# Patient Record
Sex: FEMALE | Race: WHITE | NOT HISPANIC OR LATINO | URBAN - METROPOLITAN AREA
[De-identification: names, ages, dates, MRNs, and addresses within clinical notes are randomized per-mention and may not be internally consistent; named-entity substitution may affect disease eponyms.]

---

## 2017-01-24 ENCOUNTER — OFFICE VISIT - RIVER FALLS (OUTPATIENT)
Dept: FAMILY MEDICINE | Facility: CLINIC | Age: 27
End: 2017-01-24

## 2017-01-24 ASSESSMENT — MIFFLIN-ST. JEOR: SCORE: 1268.81

## 2018-01-20 ENCOUNTER — OFFICE VISIT - RIVER FALLS (OUTPATIENT)
Dept: FAMILY MEDICINE | Facility: CLINIC | Age: 28
End: 2018-01-20

## 2022-02-12 VITALS
TEMPERATURE: 97.4 F | SYSTOLIC BLOOD PRESSURE: 92 MMHG | RESPIRATION RATE: 16 BRPM | HEIGHT: 61 IN | DIASTOLIC BLOOD PRESSURE: 60 MMHG | HEART RATE: 88 BPM | WEIGHT: 137 LBS | BODY MASS INDEX: 25.86 KG/M2

## 2022-02-12 VITALS
OXYGEN SATURATION: 99 % | DIASTOLIC BLOOD PRESSURE: 78 MMHG | SYSTOLIC BLOOD PRESSURE: 112 MMHG | TEMPERATURE: 97.3 F | WEIGHT: 136 LBS | HEART RATE: 60 BPM

## 2022-02-16 NOTE — PROGRESS NOTES
"   Patient:   JULIA ABAD            MRN: [id number]            FIN: [id number]               Age:   26 years     Sex:  Female     :  1990   Associated Diagnoses:   Mastitis   Author:   Db Son PA-C      Chief Complaint   2017 4:07 PM CST    Pt here for fever and some breast pain on left side that started yesterday. Pt states she is also having some \"cloudy milk\" from right breast that started today. Pt delivered her baby 17        History of Present Illness   Chief complaint and symptoms noted above and confirmed with patient          Review of Systems   Constitutional:  Fever.    Ear/Nose/Mouth/Throat:  Negative.    Respiratory:  Negative.    Breast:  Left breast, Pain, Redness.       Health Status   Allergies:    Allergic Reactions (Selected)  No known allergies   Medications:  (Selected)   Documented Medications  Documented  Iron Chews 15 mg oral tablet, chewable: 1 tab(s) ( 15 mg ), po, daily, 0 Refill(s), Type: Maintenance  Prenatal Multivitamins with Vitamin B Complex, Vitamin C, Minerals and L-Methylfolate oral capsule...: 1 cap(s), po, daily, 0 Refill(s), Type: Maintenance  ibuprofen 400 mg oral tablet: 1 tab(s) ( 400 mg ), po, prn, 0 Refill(s), Type: Maintenance   Problem list:    All Problems  Tobacco user / SNOMED CT 999690455 / Probable      Histories   Past Medical History:    No active or resolved past medical history items have been selected or recorded.   Family History:    High blood pressure  Father     Procedure history:    Vaginal delivery ([id number]).   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  Lives with Self, Children.  1 children.      Nutrition and Health Assessment            Type of diet: Regular.      Exercise and Physical Activity Assessment            Exercise frequency: Nothing regular.      Sexual Assessment            Sexually active: Yes.  Sexual orientation: Heterosexual.  Contraceptive Use Details: Intrauterine device.        Physical Examination   Vital Signs   1/24/2017 4:07 PM CST Temperature Tympanic 97.4 DegF  LOW    Peripheral Pulse Rate 88 bpm    Pulse Site Radial artery    Respiratory Rate 16 br/min    Systolic Blood Pressure 92 mmHg    Diastolic Blood Pressure 60 mmHg    Mean Arterial Pressure 71 mmHg    BP Site Right arm      Measurements from flowsheet : Measurements   1/24/2017 4:07 PM CST Height Measured - Standard 61 in    Weight Measured - Standard 137 lb    BSA 1.63 m2    Body Mass Index 25.88 kg/m2      General:  No acute distress.    HENT:  Tympanic membranes are clear, No pharyngeal erythema, No sinus tenderness.    Neck:  Supple, Non-tender, No lymphadenopathy.    Respiratory:  Lungs are clear to auscultation.    Cardiovascular:  Normal rate, Regular rhythm, No murmur.    Breast:       Skin changes: Left, Red, Warm, firm red warm area in upper outer quadrant.       Impression and Plan   Diagnosis     Mastitis ([id number] N61.0).     Summary:  will treat with dicloxacillin, use ibuprofen/tylenol for pain, continue nursing  follow up if not improving.    Orders     Orders   Pharmacy:  dicloxacillin 250 mg oral capsule (Prescribe): 1 cap(s) ( 250 mg ), PO, q6hr, x 10 day(s), # 40 cap(s), 0 Refill(s), Type: Maintenance, Pharmacy: [facility], 1 cap(s) po q6 hrs,x10 day(s).     Orders    Charges (Evaluation and Management):  [id number] office outpatient visit 15 minutes (Charge) (Order): Quantity: 1, Mastitis.

## 2022-02-16 NOTE — PROGRESS NOTES
Patient:   JULIA ABAD            MRN: [id number]            FIN: [id number]               Age:   27 years     Sex:  Female     :  1990   Associated Diagnoses:   Conjunctivitis   Author:   Christa Blair PA-C      Visit Information      Date of Service: 2018 01:33 pm  Performing Location: [facility]  Encounter#: [id number]      Primary Care Provider (PCP):  Debo Kingston    NPI# [id number]   Additional Information:  2018 1:43 PM CST    Patient is here for pink eye. 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  .       Chief Complaint   2018 1:43 PM CST    Patient is here for pink eye. 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     CC reviewed as above with pt          History of Present Illness             The patient presents with an eye complaint.  The eye complaint symptom(s) is described as gritty sensation, redness and mattering.  The location of the eye related symptom(s) is both eyes.  The severity of the eye symptom(s) is mild.  The symptom(s) of the eye complaint is constant.  The symptom(s) related to the eye complaint has lasted for 1 day(s).  Radiation of pain none.  The context of the eye complaint symptom(s) occurred exposure at work.  .  Associated symptoms consist of does wear contacts but not currently.  , denies blurred vision, denies double vision, denies facial pain and denies photophobia.        Review of Systems   See HPI, all other review of systems negative        Health Status   Allergies:    Allergic Reactions (Selected)  No known allergies   Problem list:    All Problems  Tobacco user / SNOMED CT 864676475 / Probable      Physical Examination   Vital Signs   2018 1:43 PM CST Temperature Tympanic 97.3 DegF  LOW    Peripheral Pulse Rate 60 bpm    HR Method Electronic    Systolic Blood Pressure 112 mmHg    Diastolic Blood Pressure 78 mmHg    Mean Arterial  Pressure 89 mmHg    BP Site Right arm    BP Method Manual    Oxygen Saturation 99 %      General:  Alert and oriented, No acute distress.    Eye:  Vision unchanged, conjunctiva mildly injectec, mild mattering no purulent discharge  .    HENT:  Tympanic membranes are clear, Oral mucosa is moist, No pharyngeal erythema, No sinus tenderness.    Neck:  Supple, Non-tender.    Respiratory:  Lungs are clear to auscultation, Respirations are non-labored.    Cardiovascular:  Normal rate, Regular rhythm, No murmur.       Impression and Plan   Diagnosis     Conjunctivitis (PDR12-HU H10.9).     Plan:  polytrim as ordered. PI given and discussed. avoid wearing contacts.  Pt instructed to return to clinic for persistent or worsening symptoms.    .